# Patient Record
Sex: FEMALE | Race: WHITE
[De-identification: names, ages, dates, MRNs, and addresses within clinical notes are randomized per-mention and may not be internally consistent; named-entity substitution may affect disease eponyms.]

---

## 2017-04-13 ENCOUNTER — HOSPITAL ENCOUNTER (OUTPATIENT)
Dept: HOSPITAL 80 - FIMAGING | Age: 62
End: 2017-04-13
Attending: FAMILY MEDICINE
Payer: COMMERCIAL

## 2017-04-13 DIAGNOSIS — M81.0: Primary | ICD-10-CM

## 2017-04-13 DIAGNOSIS — Z78.0: ICD-10-CM

## 2017-09-29 ENCOUNTER — HOSPITAL ENCOUNTER (OUTPATIENT)
Dept: HOSPITAL 80 - FIMAGING | Age: 62
End: 2017-09-29
Attending: FAMILY MEDICINE
Payer: COMMERCIAL

## 2017-09-29 DIAGNOSIS — Z12.31: Primary | ICD-10-CM

## 2017-09-29 PROCEDURE — G0202 SCR MAMMO BI INCL CAD: HCPCS

## 2018-10-12 ENCOUNTER — HOSPITAL ENCOUNTER (OUTPATIENT)
Dept: HOSPITAL 80 - FIMAGING | Age: 63
End: 2018-10-12
Attending: FAMILY MEDICINE
Payer: COMMERCIAL

## 2018-10-12 DIAGNOSIS — Z12.31: Primary | ICD-10-CM

## 2019-02-03 ENCOUNTER — HOSPITAL ENCOUNTER (EMERGENCY)
Dept: HOSPITAL 80 - FED | Age: 64
Discharge: HOME | End: 2019-02-03
Payer: COMMERCIAL

## 2019-02-03 VITALS — DIASTOLIC BLOOD PRESSURE: 85 MMHG | SYSTOLIC BLOOD PRESSURE: 125 MMHG

## 2019-02-03 DIAGNOSIS — B02.9: Primary | ICD-10-CM

## 2019-02-03 NOTE — EDPHY
H & P


Smoking Status: Never smoked


Time Seen by Provider: 02/03/19 14:41


HPI/ROS: 





CHIEF COMPLAINT:  "I think I have shingles "





HISTORY OF PRESENT ILLNESS:  63-year-old immunocompetent female complaining of 

possible herpes zoster to her face for the past 24 hr.  She notes new vesicular 

lesions on her left forehead.  The patient took a leftover acyclovir earlier 

today.  No pain with extraocular movements.  No otalgia.  No hearing loss.  No 

ocular irritation.  No intranasal irritation or intraoral lesions.  No jaw 

pain.  No fever or chills.





PRIMARY CARE PROVIDER: Dr. Domo Michaels 





REVIEW OF SYSTEMS:


10 systems reviewed and negative with the exception of the elements mentioned 

in the history of present illness








PAST MEDICAL & SURGICAL  HISTORY:  No pertinent medical or surgical history    





SOCIAL HISTORY:  Nonsmoker   











************


PHYSICAL EXAM





(Prior to examination, patient consented to physical exam, hands were washed 

and my usual and customary physical exam procedures followed)


1) GENERAL: Well-developed, well-nourished, alert and oriented.  Appears to be 

in no acute distress.


2) HEAD: Normocephalic, atraumatic


3) OCULAR EXAM:


   Visual Acuity: left: 20/25, right: 20/20, both: 20/20


   Pupils:equal round and reactive to light EOMI


   Lids: no edema or swelling, upper and lower lids were everted and no foreign 

bodies were visualized, no areas of increased fluorescein uptake.


   Skin: no proptosis, no periorbital erythema or swelling, no vesicles, no 

pain with extraocular movements.


   Conjunctivae: not injected, no discharge, negative Christine test.


   Cornea: exam with fluorescein showsno areas of increased uptake, no dendrites

, no abrasion no ulceration     


   Anterior chamber:normal, no hyphema or hypopyon.  Tonometry pressure of the 

left eye is 13


ENT:  Multiple vesicular lesions to the forehead and temporoparietal region 

consistent with zoster.  Negative Palacio sign.  Nasopharynx, oropharynx, 

clear, no lesions.  No lesions to the auricle or external auditory canal.  

Normal tympanic membrane.  No evidence of Earl Hunt.Moist Mucous membranes.


4) NECK: Full range of motion, no meningeal signs.


5) LUNGS: Clear auscultation bilaterally, no wheezes, no rhonchi, no 

retractions.   


6) HEART: Regular rate and rhythm, no murmur, no heave, no gallop.


7) ABDOMEN: No guarding, no rebound, no focal tenderness, negative McBurney's, 

negative Cevallos's, negative Rovsing's, negative peritoneal sign,


8) MUSCULOSKELETAL: Moving all extremities, no focal areas of tenderness, no 

obvious trauma.  No peripheral edema or discoloration.


9) BACK: No CVA tenderness, no midline vertebral tenderness, no fluctuance, no 

step-off, no obvious trauma, no visual or palpable abnormality. 


10) SKIN: No rash, no petechiae. 


11) Psychiatric:  Patient is oriented X 3, there is no agitation.








***************





DIFFERENTIAL DIAGNOSIS:  In no particular include but limited to facial zoster, 

zoster ophthalmicus, cellulitis 


 (Babatunde,YANNICK Chetna)


Constitutional: 





 Initial Vital Signs











Temperature (C)  36.9 C   02/03/19 14:22


 


Heart Rate  70   02/03/19 14:22


 


Respiratory Rate  16   02/03/19 14:22


 


Blood Pressure  136/87 H  02/03/19 14:22


 


O2 Sat (%)  95   02/03/19 14:22








 











O2 Delivery Mode               Room Air














Allergies/Adverse Reactions: 


 





No Known Allergies Allergy (Unverified 02/03/19 14:21)


 








Home Medications: 














 Medication  Instructions  Recorded


 


Vits,Minerals,Coq10,Fish Oil  10/20/09


 


Valacyclovir HCl [Valtrex] 1,000 mg PO TID #21 tab 02/03/19














MDM/Departure





- MDM


Medications Given: 





 








Discontinued Medications





Fluorescein Sodium (Bioglo)  1 mg OP EDNOW ONE


   Stop: 02/03/19 14:47


   Last Admin: 02/03/19 15:11 Dose:  1 mg


Proparacaine HCl (Alcaine 0.5%)  1 drops OP EDNOW ONE


   Stop: 02/03/19 14:47


   Last Admin: 02/03/19 15:11 Dose:  1 drops


Valacyclovir HCl (Valtrex)  1,000 mg PO EDNOW ONE


   Stop: 02/03/19 15:17


   Last Admin: 02/03/19 15:57 Dose:  1,000 mg





ED Course/Re-evaluation: 





Patient has no evidence of zoster ophthalmicus.  She does have evidence of 

zoster to her face.  No evidence of Earl Hunt syndrome.  She has no 

definitive involvement of the orbit.  Today is Sunday.  Doubt meningitis or 

encephalitis.  Patient has already taken a pre-hospital acyclovir.  I have 

Given the patient Valtrex prescription, recommend she see Ophthalmology 

tomorrow for comprehensive ophthalmological evaluation.  If any point she 

develops visual acuity changes, pain with eye movement, or any other symptoms 

to return to the ER immediately for re-evaluation.  She feels comfortable being 

discharged.  All questions and concerns addressed by myself.  Care of patient 

under supervision of secondary supervising physician Dr Mistry ([facility],YANNCIK Chetna)





The patient was evaluated and managed by the physician assistant.  I have 

reviewed this chart and I agree with the findings and plan of care as documented

, as indicated by my signature.  I am the secondary supervising physician. (

Sandra Mistry)





- Depart


Disposition: Home, Routine, Self-Care


Clinical Impression: 


 Herpes zoster virus infection of face and ear nerves





Condition: Good


Instructions:  Shingles (ED)


Additional Instructions: 


If you develop eye pain, eye irritation, or any other symptoms you need to seek 

immediate medical attention


Prescriptions: 


Valacyclovir HCl [Valtrex] 1,000 mg PO TID #21 tab


Referrals: 


David Mcclendon MD [Medical Doctor] - 1 day without fail (Dr. David Mcclendon 

is an ophthalmologist)

## 2019-02-18 ENCOUNTER — HOSPITAL ENCOUNTER (OUTPATIENT)
Dept: HOSPITAL 80 - FIMAGING | Age: 64
End: 2019-02-18
Attending: INTERNAL MEDICINE
Payer: COMMERCIAL

## 2019-02-18 DIAGNOSIS — M81.0: Primary | ICD-10-CM

## 2019-02-18 DIAGNOSIS — Z78.0: ICD-10-CM
